# Patient Record
Sex: FEMALE | Race: WHITE | NOT HISPANIC OR LATINO
[De-identification: names, ages, dates, MRNs, and addresses within clinical notes are randomized per-mention and may not be internally consistent; named-entity substitution may affect disease eponyms.]

---

## 2018-12-19 ENCOUNTER — APPOINTMENT (OUTPATIENT)
Dept: PULMONOLOGY | Facility: CLINIC | Age: 63
End: 2018-12-19
Payer: COMMERCIAL

## 2018-12-19 ENCOUNTER — LABORATORY RESULT (OUTPATIENT)
Age: 63
End: 2018-12-19

## 2018-12-19 VITALS
HEART RATE: 77 BPM | BODY MASS INDEX: 25.3 KG/M2 | HEIGHT: 64.5 IN | OXYGEN SATURATION: 98 % | RESPIRATION RATE: 16 BRPM | SYSTOLIC BLOOD PRESSURE: 93 MMHG | DIASTOLIC BLOOD PRESSURE: 62 MMHG | WEIGHT: 150 LBS

## 2018-12-19 DIAGNOSIS — E78.5 HYPERLIPIDEMIA, UNSPECIFIED: ICD-10-CM

## 2018-12-19 DIAGNOSIS — G43.909 MIGRAINE, UNSPECIFIED, NOT INTRACTABLE, W/OUT STATUS MIGRAINOSUS: ICD-10-CM

## 2018-12-19 DIAGNOSIS — Z87.891 PERSONAL HISTORY OF NICOTINE DEPENDENCE: ICD-10-CM

## 2018-12-19 PROCEDURE — 36415 COLL VENOUS BLD VENIPUNCTURE: CPT

## 2018-12-19 PROCEDURE — 88738 HGB QUANT TRANSCUTANEOUS: CPT

## 2018-12-19 PROCEDURE — 94060 EVALUATION OF WHEEZING: CPT

## 2018-12-19 PROCEDURE — 99245 OFF/OP CONSLTJ NEW/EST HI 55: CPT | Mod: 25

## 2018-12-19 PROCEDURE — 94750: CPT

## 2018-12-19 PROCEDURE — 94726 PLETHYSMOGRAPHY LUNG VOLUMES: CPT

## 2018-12-19 PROCEDURE — 94664 DEMO&/EVAL PT USE INHALER: CPT | Mod: 59

## 2018-12-19 PROCEDURE — 94729 DIFFUSING CAPACITY: CPT

## 2018-12-21 LAB
ACE BLD-CCNC: 51 U/L
CK SERPL-CCNC: 57 U/L
CRP SERPL-MCNC: 0.17 MG/DL
ENA JO1 AB SER IA-ACNC: <0.2 AL
ENA SCL70 IGG SER IA-ACNC: <0.2 AL
ERYTHROCYTE [SEDIMENTATION RATE] IN BLOOD BY WESTERGREN METHOD: 34 MM/HR
HIV1+2 AB SPEC QL IA.RAPID: NONREACTIVE
HP PNL SER: NORMAL
MPO AB + PR3 PNL SER: NORMAL
POCT - HEMOGLOBIN (HGB), QUANTITATIVE, TRANSCUTANEOUS: 12.4
RHEUMATOID FACT SER QL: <10 IU/ML

## 2018-12-25 LAB — ANA SER IF-ACNC: NEGATIVE

## 2019-04-18 ENCOUNTER — OTHER (OUTPATIENT)
Age: 64
End: 2019-04-18

## 2019-05-31 ENCOUNTER — APPOINTMENT (OUTPATIENT)
Dept: PULMONOLOGY | Facility: CLINIC | Age: 64
End: 2019-05-31
Payer: COMMERCIAL

## 2019-05-31 VITALS
OXYGEN SATURATION: 98 % | WEIGHT: 150 LBS | SYSTOLIC BLOOD PRESSURE: 95 MMHG | BODY MASS INDEX: 25.3 KG/M2 | HEART RATE: 91 BPM | DIASTOLIC BLOOD PRESSURE: 62 MMHG | HEIGHT: 64.5 IN

## 2019-05-31 PROCEDURE — 99214 OFFICE O/P EST MOD 30 MIN: CPT

## 2019-06-03 NOTE — ASSESSMENT
[FreeTextEntry1] : Obtain all data from second opinion and review old cts.\par Decision on further intervention. \par \par ADD: reviwed ct 2011.\par Probable mild progression. There is difference in technique.\par Recc observation

## 2019-06-03 NOTE — HISTORY OF PRESENT ILLNESS
[FreeTextEntry1] : Was improved now mild increase in SOB\par \par No cough\par Positive diff. with allergies. \par \par Went for second opinion in [place]. Believe conclusion no intervention.\par \par Brought old CT Scans.

## 2019-06-03 NOTE — PHYSICAL EXAM
[General Appearance - Well Developed] : well developed [General Appearance - Well Nourished] : well nourished [Normal Oropharynx] : normal oropharynx [Jugular Venous Distention Increased] : there was no jugular-venous distention [Heart Sounds] : normal S1 and S2 [Murmurs] : no murmurs present [Edema] : no peripheral edema present [Lungs Percussion] : the lungs were normal to percussion [Abdomen Soft] : soft [Abdomen Tenderness] : non-tender [Abdomen Mass (___ Cm)] : no abdominal mass palpated [Nail Clubbing] : no clubbing of the fingernails [Cyanosis, Localized] : no localized cyanosis [Petechial Hemorrhages (___cm)] : no petechial hemorrhages [] : no ischemic changes [FreeTextEntry1] : Few basilar crackles.

## 2019-08-26 ENCOUNTER — APPOINTMENT (OUTPATIENT)
Dept: PULMONOLOGY | Facility: CLINIC | Age: 64
End: 2019-08-26

## 2019-09-06 ENCOUNTER — APPOINTMENT (OUTPATIENT)
Dept: PULMONOLOGY | Facility: CLINIC | Age: 64
End: 2019-09-06
Payer: COMMERCIAL

## 2019-09-06 VITALS
DIASTOLIC BLOOD PRESSURE: 69 MMHG | SYSTOLIC BLOOD PRESSURE: 107 MMHG | HEART RATE: 74 BPM | OXYGEN SATURATION: 98 % | WEIGHT: 150 LBS | RESPIRATION RATE: 16 BRPM | BODY MASS INDEX: 25.61 KG/M2 | HEIGHT: 64 IN

## 2019-09-06 LAB — POCT - HEMOGLOBIN (HGB), QUANTITATIVE, TRANSCUTANEOUS: 12

## 2019-09-06 PROCEDURE — 94750: CPT

## 2019-09-06 PROCEDURE — 99213 OFFICE O/P EST LOW 20 MIN: CPT | Mod: 25

## 2019-09-06 PROCEDURE — 94060 EVALUATION OF WHEEZING: CPT

## 2019-09-06 PROCEDURE — 88738 HGB QUANT TRANSCUTANEOUS: CPT

## 2019-09-06 PROCEDURE — 71046 X-RAY EXAM CHEST 2 VIEWS: CPT

## 2019-09-06 PROCEDURE — ZZZZZ: CPT

## 2019-09-06 PROCEDURE — 94726 PLETHYSMOGRAPHY LUNG VOLUMES: CPT

## 2019-09-06 PROCEDURE — 94664 DEMO&/EVAL PT USE INHALER: CPT | Mod: 59

## 2019-09-06 PROCEDURE — 94729 DIFFUSING CAPACITY: CPT

## 2019-09-06 NOTE — PROCEDURE
[FreeTextEntry1] : PA and lateral chest radiograph reveals a normal heart and mediastinum. Bony structures are intact. Lung fields are clear bilaterally. There is no significant pleural disease.\par \par Pulmonary function testing.\par FEV1, FVC, and FEV1/FVC are within normal limits. There was not a significant response to inhaled bronchodilator. TLC and subdivisions are normal. RV/TLC ratio is normal. Resistance is normal; specific conductance is decreased. There is a mild-mod diffusion impairment \par PFT attached relatively stable function.\par

## 2019-09-06 NOTE — PHYSICAL EXAM
[General Appearance - Well Nourished] : well nourished [General Appearance - Well Developed] : well developed [Normal Oropharynx] : normal oropharynx [Jugular Venous Distention Increased] : there was no jugular-venous distention [Murmurs] : no murmurs present [Heart Sounds] : normal S1 and S2 [Edema] : no peripheral edema present [Lungs Percussion] : the lungs were normal to percussion [Abdomen Soft] : soft [Abdomen Mass (___ Cm)] : no abdominal mass palpated [Abdomen Tenderness] : non-tender [Cyanosis, Localized] : no localized cyanosis [Nail Clubbing] : no clubbing of the fingernails [Petechial Hemorrhages (___cm)] : no petechial hemorrhages [] : no ischemic changes [FreeTextEntry1] : Few basilar crackles.

## 2019-09-06 NOTE — DISCUSSION/SUMMARY
[FreeTextEntry1] : ILD clinically and radiographically stable\par Ground glass opacity not reported 2/19

## 2020-03-05 ENCOUNTER — APPOINTMENT (OUTPATIENT)
Dept: PULMONOLOGY | Facility: CLINIC | Age: 65
End: 2020-03-05

## 2020-03-06 ENCOUNTER — APPOINTMENT (OUTPATIENT)
Dept: PULMONOLOGY | Facility: CLINIC | Age: 65
End: 2020-03-06
Payer: COMMERCIAL

## 2020-03-06 VITALS
SYSTOLIC BLOOD PRESSURE: 110 MMHG | BODY MASS INDEX: 25.33 KG/M2 | RESPIRATION RATE: 12 BRPM | DIASTOLIC BLOOD PRESSURE: 70 MMHG | WEIGHT: 152 LBS | HEIGHT: 65 IN | OXYGEN SATURATION: 97 % | HEART RATE: 89 BPM

## 2020-03-06 PROCEDURE — 99214 OFFICE O/P EST MOD 30 MIN: CPT | Mod: 25

## 2020-03-06 PROCEDURE — 94726 PLETHYSMOGRAPHY LUNG VOLUMES: CPT

## 2020-03-06 PROCEDURE — 94664 DEMO&/EVAL PT USE INHALER: CPT | Mod: 59

## 2020-03-06 PROCEDURE — 94729 DIFFUSING CAPACITY: CPT

## 2020-03-06 PROCEDURE — 94750: CPT

## 2020-03-06 PROCEDURE — 94060 EVALUATION OF WHEEZING: CPT

## 2020-03-07 NOTE — PHYSICAL EXAM
[No Acute Distress] : no acute distress [Normal Oropharynx] : normal oropharynx [Supple] : supple [No Neck Mass] : no neck mass [No JVD] : no jvd [Normal S1, S2] : normal s1, s2 [Clear to Auscultation Bilaterally] : clear to auscultation bilaterally [Normal to Percussion] : normal to percussion [Benign] : benign [Not Tender] : not tender [No Masses] : no masses [No Clubbing] : no clubbing [No Cyanosis] : no cyanosis [No Edema] : no edema

## 2020-03-09 NOTE — ASSESSMENT
[FreeTextEntry1] : Continue observation\par Review CT and compare to old re: pulmonary nodule.\par \par Add: reviewed prior CT. Ground glass nodule noted 12/18 resolved.

## 2020-03-09 NOTE — PROCEDURE
[FreeTextEntry1] : 03/06/2020\par Pulmonary function testing\par Normal Flow Rates There is no significant bronchodilator response. Normal Lung Volumes. There is a moderate diffusion impairment. Corrects to mild with lung volume correction Airway resistance is normal.

## 2020-03-09 NOTE — HISTORY OF PRESENT ILLNESS
[TextBox_4] : Patient feeling well. No significant change in status.\par Some interm. ALONZO very variable.\par Denies cough, wheezing, chest congestion\par \par No rheum or dermatologic complaints.

## 2020-04-20 ENCOUNTER — APPOINTMENT (OUTPATIENT)
Dept: PULMONOLOGY | Facility: CLINIC | Age: 65
End: 2020-04-20
Payer: COMMERCIAL

## 2020-04-20 PROCEDURE — 99213 OFFICE O/P EST LOW 20 MIN: CPT | Mod: 95

## 2020-04-20 NOTE — ASSESSMENT
[FreeTextEntry1] : Based upon symptom complex there is a fair degree of likelihood that patient is infected with coronavirus. Based upon current recommendations and due to the fact that She  is doing well without significant respiratory symptoms I have recommended that the patient remain home in isolation. Should She  develop increasing respiratory symptoms this patient will call or go to the emergency room and COVID testing can be performed at that time.\par \par Continue present care with rest home, fluids, PRN Tylenol.\par \par  Ms. GENE PHILIPPE has been advised not to return to work or regular activities until asymptomatic, without fever for 3 days,  for a total quarantine duration of minimal of 2 weeks.\par \par

## 2020-04-20 NOTE — HISTORY OF PRESENT ILLNESS
[Home] : at home, [unfilled] , at the time of the visit. [Medical Office: ([facility])___] : at the medical office located in  [TextBox_4] : This visit was provided via telehealth using real-time 2-way audio visual technology. The patient, GENE PHILIPPE , was located at home, [address] [address]  at the time of the visit.\par The provider, Joshua Kaur, was located at office [address] [place], [place] at the time of the visit. \par \par  The patient, Ms. GENE PHILIPPE  and Physician Joshua Rich participated in the telehealth encounter.\par \par Verbal consent obtained by  from patient\par \par  got presumed. COVID\par \par Got conjunctivitis and has had fatigue, myalgia and GI symptoms. 1 week into symptoms.\par \par No breathing issues. \par No cough.\par No fever.\par Presently feels relatively well. \par

## 2020-06-18 ENCOUNTER — APPOINTMENT (OUTPATIENT)
Dept: PULMONOLOGY | Facility: CLINIC | Age: 65
End: 2020-06-18
Payer: COMMERCIAL

## 2020-06-18 VITALS
HEIGHT: 65 IN | BODY MASS INDEX: 25.33 KG/M2 | TEMPERATURE: 97.7 F | DIASTOLIC BLOOD PRESSURE: 68 MMHG | OXYGEN SATURATION: 98 % | HEART RATE: 81 BPM | RESPIRATION RATE: 16 BRPM | SYSTOLIC BLOOD PRESSURE: 102 MMHG | WEIGHT: 152 LBS

## 2020-06-18 DIAGNOSIS — Z20.828 CONTACT WITH AND (SUSPECTED) EXPOSURE TO OTHER VIRAL COMMUNICABLE DISEASES: ICD-10-CM

## 2020-06-18 PROCEDURE — 99214 OFFICE O/P EST MOD 30 MIN: CPT | Mod: 25

## 2020-06-18 PROCEDURE — 36415 COLL VENOUS BLD VENIPUNCTURE: CPT

## 2020-06-18 PROCEDURE — 71046 X-RAY EXAM CHEST 2 VIEWS: CPT

## 2020-06-18 NOTE — PHYSICAL EXAM
[No Acute Distress] : no acute distress [Normal Oropharynx] : normal oropharynx [Supple] : supple [No JVD] : no jvd [No Neck Mass] : no neck mass [Clear to Auscultation Bilaterally] : clear to auscultation bilaterally [Normal S1, S2] : normal s1, s2 [Normal to Percussion] : normal to percussion [Not Tender] : not tender [Benign] : benign [No Clubbing] : no clubbing [No Masses] : no masses [No Cyanosis] : no cyanosis [No Edema] : no edema

## 2020-06-19 LAB
SARS-COV-2 IGG SERPL IA-ACNC: <0.1 INDEX
SARS-COV-2 IGG SERPL QL IA: NEGATIVE

## 2020-06-19 NOTE — DISCUSSION/SUMMARY
[FreeTextEntry1] : ILD relatively stable\par Pulmonary nodule\par COVID-19 antibody negative\par suspected COVID but AB negative\par repeat COVID AB\par PFT f/u with 6 min walk\par Over 19 precautions\par May continue to exercise\par

## 2020-06-19 NOTE — PROCEDURE
[FreeTextEntry1] : Chest x-ray June 18, 2020\par Normal cardiac silhouette size\par There is a suggestion of some fine bilateral hilum normal\par Soft tissue bony structures normal\par No interval change compared to a chest x-ray of September 6, 2019\par \par \par 03/06/2020\par Pulmonary function testing\par Normal Flow Rates There is no significant bronchodilator response. Normal Lung Volumes. There is a moderate diffusion impairment. Corrects to mild with lung volume correction Airway resistance is normal.

## 2020-06-19 NOTE — HISTORY OF PRESENT ILLNESS
[TextBox_4] :  got presumed. COVID\par \par Got conjunctivitis and has had fatigue, myalgia and GI symptoms. 1 week into symptoms.\par \par No breathing issues. \par No cough.\par No fever.\par Presently feels relatively well. \par  \par ALONZO but continues to exercises'

## 2020-07-14 ENCOUNTER — RESULT CHARGE (OUTPATIENT)
Age: 65
End: 2020-07-14

## 2020-07-15 ENCOUNTER — APPOINTMENT (OUTPATIENT)
Dept: PULMONOLOGY | Facility: CLINIC | Age: 65
End: 2020-07-15
Payer: COMMERCIAL

## 2020-07-15 VITALS
HEIGHT: 65 IN | SYSTOLIC BLOOD PRESSURE: 119 MMHG | OXYGEN SATURATION: 96 % | TEMPERATURE: 97.8 F | DIASTOLIC BLOOD PRESSURE: 66 MMHG | BODY MASS INDEX: 24.66 KG/M2 | WEIGHT: 148 LBS | HEART RATE: 86 BPM

## 2020-07-15 PROCEDURE — 94727 GAS DIL/WSHOT DETER LNG VOL: CPT

## 2020-07-15 PROCEDURE — 99213 OFFICE O/P EST LOW 20 MIN: CPT | Mod: 25

## 2020-07-15 PROCEDURE — 94729 DIFFUSING CAPACITY: CPT

## 2020-07-15 PROCEDURE — 94060 EVALUATION OF WHEEZING: CPT

## 2020-07-15 PROCEDURE — ZZZZZ: CPT

## 2020-07-15 NOTE — PROCEDURE
[FreeTextEntry1] : 07/15/2020\par Pulmonary function testing\par Normal Flow Rates There is no significant bronchodilator response. There is a mild reduction in lung volume. There is a moderate diffusion impairment. \par No significant change in function.

## 2020-07-15 NOTE — PHYSICAL EXAM
[No Acute Distress] : no acute distress [Normal Oropharynx] : normal oropharynx [Normal Appearance] : normal appearance [No Abnormalities] : no abnormalities [Normal S1, S2] : normal s1, s2 [Benign] : benign [No Clubbing] : no clubbing [No Cyanosis] : no cyanosis [No Edema] : no edema [No Focal Deficits] : no focal deficits [Oriented x3] : oriented x3 [TextBox_68] : Few crackles

## 2020-07-15 NOTE — HISTORY OF PRESENT ILLNESS
[TextBox_4] : Patient has been feeling well.\par \par No significant cough, wheezing, chest pain or SOB.\par

## 2020-07-15 NOTE — ASSESSMENT
[FreeTextEntry1] : Follow-up for reevaluation and lung function testing in 6 months.\par Decision on need for follow-up CAT scan at that time.\par Follow-up sooner on a as needed basis.

## 2020-07-15 NOTE — PHYSICAL EXAM
[No Acute Distress] : no acute distress [Normal Oropharynx] : normal oropharynx [Normal Appearance] : normal appearance [Normal S1, S2] : normal s1, s2 [No Abnormalities] : no abnormalities [Benign] : benign [No Clubbing] : no clubbing [No Cyanosis] : no cyanosis [No Edema] : no edema [No Focal Deficits] : no focal deficits [Oriented x3] : oriented x3 [TextBox_68] : Few crackles

## 2020-12-08 ENCOUNTER — APPOINTMENT (OUTPATIENT)
Dept: PULMONOLOGY | Facility: CLINIC | Age: 65
End: 2020-12-08

## 2021-01-08 DIAGNOSIS — Z20.822 CONTACT WITH AND (SUSPECTED) EXPOSURE TO COVID-19: ICD-10-CM

## 2021-01-15 ENCOUNTER — APPOINTMENT (OUTPATIENT)
Dept: PULMONOLOGY | Facility: CLINIC | Age: 66
End: 2021-01-15

## 2021-08-24 ENCOUNTER — APPOINTMENT (OUTPATIENT)
Dept: PULMONOLOGY | Facility: CLINIC | Age: 66
End: 2021-08-24
Payer: MEDICARE

## 2021-08-24 PROCEDURE — 99213 OFFICE O/P EST LOW 20 MIN: CPT | Mod: 95

## 2021-08-26 NOTE — HISTORY OF PRESENT ILLNESS
[Home] : at home, [unfilled] , at the time of the visit. [Medical Office: ([facility])___] : at the medical office located in  [TextBox_4] : This visit was provided via telehealth using real-time 2-way audio visual technology. The patient, GENE PHILIPPE , was located at home, [address] [place] [address]  at the time of the visit.\par The provider, Joshua Kaur, was located at office [address] [place], [place] at the time of the visit. \par \par  The patient, Ms. GENE PHILIPPE  and Physician Joshua Rich participated in the telehealth encounter.\par \par Verbal consent obtained by  from patient\par \par Had virus ended up in hospital for SOB.\par Treated with steroids and nebulizer.\par Treated 5 days of steroids.\par Now improved.\par Believes maybe RSV\par COVID negative.\par \par Has appt. \par Saw MD in [place]\par Believes had CT. Had PFT 4/21/21\par Otherwise doing well.\par \par

## 2021-09-14 ENCOUNTER — APPOINTMENT (OUTPATIENT)
Dept: PULMONOLOGY | Facility: CLINIC | Age: 66
End: 2021-09-14
Payer: MEDICARE

## 2021-09-14 VITALS
SYSTOLIC BLOOD PRESSURE: 111 MMHG | HEART RATE: 78 BPM | TEMPERATURE: 97.8 F | DIASTOLIC BLOOD PRESSURE: 65 MMHG | OXYGEN SATURATION: 97 %

## 2021-09-14 DIAGNOSIS — Z23 ENCOUNTER FOR IMMUNIZATION: ICD-10-CM

## 2021-09-14 PROCEDURE — 99215 OFFICE O/P EST HI 40 MIN: CPT

## 2021-09-16 NOTE — HISTORY OF PRESENT ILLNESS
[Medical Office: ([facility])___] : at the medical office located in  [TextBox_4] : S/P hosp. for probable viral infection with respiratory compromise.\par Now feels pretty well\par Essentially baseline\par Presently without cough, wheezing or CP\par Mild ALONZO. \par \par Had PFT in [place]  4/12/21\par CT of the chest in [place] as well.\par Seeing pulmonologist in [place].

## 2021-09-16 NOTE — PROCEDURE
[FreeTextEntry1] : Reviewed pulmonary function testing performed in [place].\par \par Reviewed CT of the chest.  Reticular changes do not appear significantly different from prior dating back to 2019.

## 2021-09-16 NOTE — ASSESSMENT
[FreeTextEntry1] : Continue observation.\par Would not recommend antifibrotic therapy at this time.  Would recommend if evidence of disease progression.\par Repeat lung function testing.  Patient to obtain at lab in [place].\par Repeat CAT scan 1 year from prior.  Asked patient to go to one facility for CAT scans.  Recommended direct comparison to radiology of old and new CT.  Patient agrees.

## 2021-09-16 NOTE — DISCUSSION/SUMMARY
[FreeTextEntry1] : Interstitial lung disease appears clinically radiographically and functionally stable.  Etiology nonspecific.  No honeycombing.\par Status post hospitalization for probable viral infection and asthmatic bronchitis.\par Pulmonary nodules.

## 2021-10-12 ENCOUNTER — APPOINTMENT (OUTPATIENT)
Dept: PULMONOLOGY | Facility: CLINIC | Age: 66
End: 2021-10-12

## 2022-04-19 ENCOUNTER — APPOINTMENT (OUTPATIENT)
Dept: PULMONOLOGY | Facility: CLINIC | Age: 67
End: 2022-04-19
Payer: MEDICARE

## 2022-04-19 DIAGNOSIS — C50.919 MALIGNANT NEOPLASM OF UNSPECIFIED SITE OF UNSPECIFIED FEMALE BREAST: ICD-10-CM

## 2022-04-19 PROCEDURE — 99214 OFFICE O/P EST MOD 30 MIN: CPT | Mod: CS,95

## 2022-04-19 RX ORDER — VENLAFAXINE HCL 50 MG
TABLET ORAL
Refills: 0 | Status: ACTIVE | COMMUNITY

## 2022-04-19 RX ORDER — PRAVASTATIN SODIUM 80 MG/1
TABLET ORAL
Refills: 0 | Status: ACTIVE | COMMUNITY

## 2022-04-19 RX ORDER — PANTOPRAZOLE 40 MG/1
TABLET, DELAYED RELEASE ORAL
Refills: 0 | Status: ACTIVE | COMMUNITY

## 2022-04-21 PROBLEM — C50.919 BREAST CARCINOMA: Status: ACTIVE | Noted: 2018-12-19

## 2022-04-21 NOTE — DISCUSSION/SUMMARY
[FreeTextEntry1] : COVID virus infection.  Recently relatively mild symptoms.\par Comorbidity mild ILD and history of breast carcinoma.

## 2022-04-21 NOTE — ASSESSMENT
[FreeTextEntry1] : Rest home and isolate.\par Oral hydration.\par PRN Tylenol.\par Pepcid 20 mg twice daily.\par Vitamin D 2000 units/day.\par Baby aspirin.\par Discussed Paxlovid.  Will observe over the next few days.  Does not want at this time.\par Decision on labs pending clinical.\par Follow-up post oximetry.  Parameters discussed.\par Close observation.

## 2022-04-21 NOTE — HISTORY OF PRESENT ILLNESS
[Home] : at home, [unfilled] , at the time of the visit. [Medical Office: ([facility])___] : at the medical office located in  [TextBox_4] : This visit was provided via telehealth using real-time 2-way audio visual technology. The patient, GENE PHILIPPE , was located at home, [address] [place] [address]  at the time of the visit.\par The provider, Joshua Kaur, was located at office [facility] [place], [place] at the time of the visit. \par \par  The patient, Ms. GENE PHILIPPE  and Physician Joshua Rich participated in the telehealth encounter.\par \par Verbal consent obtained by  from patient\par \par Developed shingles last Friday.\par Last PM felt poorly and tested positive COVID\par On Valtrex.\par Symptoms runny nose. No fever.\par Pain in eye\par Has pulse oximeter was 95\par No SOB.  No chest pain.  No significant cough.\par No loss of taste or smell.\par Vaccinated and one Booster.\par

## 2022-05-24 ENCOUNTER — APPOINTMENT (OUTPATIENT)
Dept: PULMONOLOGY | Facility: CLINIC | Age: 67
End: 2022-05-24
Payer: MEDICARE

## 2022-05-24 VITALS
SYSTOLIC BLOOD PRESSURE: 107 MMHG | WEIGHT: 150 LBS | HEIGHT: 65 IN | TEMPERATURE: 98.2 F | RESPIRATION RATE: 14 BRPM | DIASTOLIC BLOOD PRESSURE: 70 MMHG | OXYGEN SATURATION: 96 % | HEART RATE: 98 BPM | BODY MASS INDEX: 24.99 KG/M2

## 2022-05-24 DIAGNOSIS — U07.1 COVID-19: ICD-10-CM

## 2022-05-24 LAB — POCT - HEMOGLOBIN (HGB), QUANTITATIVE, TRANSCUTANEOUS: 12.4

## 2022-05-24 PROCEDURE — 88738 HGB QUANT TRANSCUTANEOUS: CPT

## 2022-05-24 PROCEDURE — 94729 DIFFUSING CAPACITY: CPT

## 2022-05-24 PROCEDURE — ZZZZZ: CPT

## 2022-05-24 PROCEDURE — 94726 PLETHYSMOGRAPHY LUNG VOLUMES: CPT

## 2022-05-24 PROCEDURE — 99214 OFFICE O/P EST MOD 30 MIN: CPT | Mod: CS,25

## 2022-05-24 PROCEDURE — 94010 BREATHING CAPACITY TEST: CPT

## 2022-05-24 NOTE — PHYSICAL EXAM
[No Acute Distress] : no acute distress [Normal Oropharynx] : normal oropharynx [Normal Appearance] : normal appearance [No Neck Mass] : no neck mass [Normal Rate/Rhythm] : normal rate/rhythm [Normal S1, S2] : normal s1, s2 [No Murmurs] : no murmurs [No Resp Distress] : no resp distress [Normal to Percussion] : normal to percussion [No Abnormalities] : no abnormalities [Benign] : benign [No HSM] : no hsm [Normal Gait] : normal gait [No Clubbing] : no clubbing [No Cyanosis] : no cyanosis [No Edema] : no edema [FROM] : FROM [Normal Color/ Pigmentation] : normal color/ pigmentation [No Focal Deficits] : no focal deficits [Oriented x3] : oriented x3 [Normal Affect] : normal affect [TextBox_68] : Few to 1 plus basilar crackles.

## 2022-05-24 NOTE — PROCEDURE
[FreeTextEntry1] : 05/24/2022\par Pulmonary function testing\par There is a mild ventilatory impairment in a restrictive pattern. There is a mild reduction in lung volume. Airway resistance is normal. There is a moderate diffusion impairment. Corrects to mild with lung volume correction

## 2022-05-24 NOTE — HISTORY OF PRESENT ILLNESS
[TextBox_4] : S/P COVID \par S/P shingles\par Now down to Gabapentin 100 mg.\par \par Much less exercise. \par \par Grandfather had ILD\par \par

## 2022-05-24 NOTE — DISCUSSION/SUMMARY
[FreeTextEntry1] : Interstitial lung disease.  Patient feeling well.  Mild decrement in function but also had COVID virus infection.  No honeycombing.\par Status post COVID.\par Pulmonary nodules.

## 2022-05-24 NOTE — ASSESSMENT
[FreeTextEntry1] : Follow-up CT Chest HRCT.\par Increase exercise\par Continue observation.\par Further recommendations regarding therapy or biopsy pending repeat CT.  Ordered.\par Consider genetic testing.\par \par 35 minutes spent in evaluation management and review of studies.

## 2022-05-31 ENCOUNTER — OUTPATIENT (OUTPATIENT)
Dept: OUTPATIENT SERVICES | Facility: HOSPITAL | Age: 67
LOS: 1 days | End: 2022-05-31
Payer: MEDICARE

## 2022-05-31 ENCOUNTER — APPOINTMENT (OUTPATIENT)
Dept: CT IMAGING | Facility: CLINIC | Age: 67
End: 2022-05-31
Payer: MEDICARE

## 2022-05-31 DIAGNOSIS — J84.9 INTERSTITIAL PULMONARY DISEASE, UNSPECIFIED: ICD-10-CM

## 2022-05-31 PROCEDURE — G1004: CPT

## 2022-05-31 PROCEDURE — 71250 CT THORAX DX C-: CPT | Mod: ME

## 2022-05-31 PROCEDURE — 71250 CT THORAX DX C-: CPT | Mod: 26,ME

## 2022-11-01 ENCOUNTER — APPOINTMENT (OUTPATIENT)
Dept: PULMONOLOGY | Facility: CLINIC | Age: 67
End: 2022-11-01

## 2023-06-05 ENCOUNTER — APPOINTMENT (OUTPATIENT)
Dept: PULMONOLOGY | Facility: CLINIC | Age: 68
End: 2023-06-05

## 2023-06-19 ENCOUNTER — APPOINTMENT (OUTPATIENT)
Dept: PULMONOLOGY | Facility: CLINIC | Age: 68
End: 2023-06-19
Payer: MEDICARE

## 2023-06-19 VITALS — HEART RATE: 88 BPM | DIASTOLIC BLOOD PRESSURE: 65 MMHG | SYSTOLIC BLOOD PRESSURE: 100 MMHG | OXYGEN SATURATION: 93 %

## 2023-06-19 DIAGNOSIS — J84.9 INTERSTITIAL PULMONARY DISEASE, UNSPECIFIED: ICD-10-CM

## 2023-06-19 DIAGNOSIS — R91.1 SOLITARY PULMONARY NODULE: ICD-10-CM

## 2023-06-19 LAB — POCT - HEMOGLOBIN (HGB), QUANTITATIVE, TRANSCUTANEOUS: 12.8

## 2023-06-19 PROCEDURE — 94729 DIFFUSING CAPACITY: CPT

## 2023-06-19 PROCEDURE — ZZZZZ: CPT

## 2023-06-19 PROCEDURE — 99214 OFFICE O/P EST MOD 30 MIN: CPT | Mod: 25

## 2023-06-19 PROCEDURE — 94010 BREATHING CAPACITY TEST: CPT

## 2023-06-19 PROCEDURE — 94726 PLETHYSMOGRAPHY LUNG VOLUMES: CPT

## 2023-06-19 PROCEDURE — 88738 HGB QUANT TRANSCUTANEOUS: CPT

## 2023-06-19 NOTE — DISCUSSION/SUMMARY
[FreeTextEntry1] : Interstitial lung disease of unclear etiology.  No honeycombing.\par Has been chronic.\par No significant change in clinical or functional status.

## 2023-06-19 NOTE — ASSESSMENT
[FreeTextEntry1] : Follow-up high-resolution CT of the chest.\par Further recommendations after review of above.\par \par \par 35 minutes spent in evaluation management and review of studies.\par

## 2023-06-19 NOTE — ADDENDUM
[FreeTextEntry1] : Discussed venlaxafine and ILD\par Recc. changing drug\par May have started prior to drug. Would still recc. alternative.

## 2023-06-19 NOTE — PHYSICAL EXAM
[No Acute Distress] : no acute distress [Normal Oropharynx] : normal oropharynx [Normal Appearance] : normal appearance [Normal S1, S2] : normal s1, s2 [No Murmurs] : no murmurs [Normal to Percussion] : normal to percussion [No Abnormalities] : no abnormalities [Benign] : benign [Not Tender] : not tender [No HSM] : no hsm [No Clubbing] : no clubbing [No Cyanosis] : no cyanosis [No Edema] : no edema [No Focal Deficits] : no focal deficits [Oriented x3] : oriented x3 [TextBox_68] : Few to 1 plus crackles bilateral.  Basilar.

## 2023-06-19 NOTE — PROCEDURE
[FreeTextEntry1] : 06/19/2023\par Pulmonary function testing\par Normal Flow Rates There is a mild reduction in lung volume. Airway resistance is normal. There is a moderate diffusion impairment. Corrects to normal with lung volume correction \par No significant change in function compared to May 2022.

## 2023-07-21 ENCOUNTER — OUTPATIENT (OUTPATIENT)
Dept: OUTPATIENT SERVICES | Facility: HOSPITAL | Age: 68
LOS: 1 days | End: 2023-07-21
Payer: MEDICARE

## 2023-07-21 ENCOUNTER — APPOINTMENT (OUTPATIENT)
Dept: CT IMAGING | Facility: CLINIC | Age: 68
End: 2023-07-21
Payer: MEDICARE

## 2023-07-21 DIAGNOSIS — J84.9 INTERSTITIAL PULMONARY DISEASE, UNSPECIFIED: ICD-10-CM

## 2023-07-21 PROCEDURE — 71250 CT THORAX DX C-: CPT | Mod: 26,MH

## 2023-07-21 PROCEDURE — 71250 CT THORAX DX C-: CPT

## 2023-08-03 ENCOUNTER — NON-APPOINTMENT (OUTPATIENT)
Age: 68
End: 2023-08-03